# Patient Record
Sex: FEMALE | Race: WHITE
[De-identification: names, ages, dates, MRNs, and addresses within clinical notes are randomized per-mention and may not be internally consistent; named-entity substitution may affect disease eponyms.]

---

## 2022-11-10 ENCOUNTER — HOSPITAL ENCOUNTER (EMERGENCY)
Dept: HOSPITAL 97 - ER | Age: 22
Discharge: HOME | End: 2022-11-10
Payer: SELF-PAY

## 2022-11-10 VITALS — TEMPERATURE: 98.7 F | SYSTOLIC BLOOD PRESSURE: 136 MMHG | DIASTOLIC BLOOD PRESSURE: 79 MMHG | OXYGEN SATURATION: 100 %

## 2022-11-10 DIAGNOSIS — L03.312: Primary | ICD-10-CM

## 2022-11-10 PROCEDURE — 99281 EMR DPT VST MAYX REQ PHY/QHP: CPT

## 2022-11-10 NOTE — ER
Nurse's Notes                                                                                     

 [facility]                                                                 

Name: Temi Chand                                                                               

Age: 22 yrs                                                                                       

Sex: Female                                                                                       

: 2000                                                                                   

MRN: [id number]                                                                                   

Arrival Date: 11/10/2022                                                                          

Time: 08:44                                                                                       

Account#: [id number]                                                                            

Bed Waiting                                                                                       

Private MD:                                                                                       

Diagnosis: Cellulitis of back [any part except buttock]                                           

                                                                                                  

Presentation:                                                                                     

11/10                                                                                             

09:00 Chief complaint: Patient states: was at [facility] this morning, dx with cellulitis and      jh5 

      given bactrim, keflex, and muprirocin. Coronavirus screen: Vaccine status: Patient          

      reports being unvaccinated. Ebola Screen: Patient negative for fever greater than or        

      equal to 101.5 degrees Fahrenheit, and additional compatible Ebola Virus Disease            

      symptoms Patient denies exposure to infectious person. Patient denies travel to an          

      Ebola-affected area in the 21 days before illness onset. Initial Sepsis Screen: Does        

      the patient meet any 2 criteria? No. Patient's initial sepsis screen is negative. Does      

      the patient have a suspected source of infection? No. Patient's initial sepsis screen       

      is negative. Risk Assessment: Do you want to hurt yourself or someone else? Patient         

      reports no desire to harm self or others.                                                   

09:00 Method Of Arrival: Ambulatory                                                           [facility] 

09:00 Acuity: NAVARRO 4                                                                           5 

                                                                                                  

Triage Assessment:                                                                                

09:06 General: Appears in no apparent distress. slender, well groomed, well developed,        [facility] 

      Behavior is calm, cooperative, appropriate for age. Pain: Complains of pain in back.        

                                                                                                  

OB/GYN:                                                                                           

09:06 LMP N/A - Birth control method                                                          [facility] 

                                                                                                  

Historical:                                                                                       

- Allergies:                                                                                      

09:06 No Known Allergies;                                                                     [facility] 

- PMHx:                                                                                           

09:06 None;                                                                                   [facility] 

                                                                                                  

- Immunization history:: Adult Immunizations up to date.                                          

- Social history:: Smoking status: Patient denies any tobacco usage or history of.                

                                                                                                  

                                                                                                  

Vital Signs:                                                                                      

09:00  / 79; Pulse 92; Resp 16; Temp 98.7; Pulse Ox 100% ; Weight 56.7 kg; Height 5 ft. jh5 

      4 in. (162.56 cm); Pain 3/10;                                                               

09:00 Body Mass Index 21.46 (56.70 kg, 162.56 cm)                                             [facility] 

                                                                                                  

ED Course:                                                                                        

08:44 Patient arrived in ED.                                                                  mr  

09:00 Es Mckeon PA is PHCP.                                                         en  

09:00 Jc Yeboah MD is Attending Physician.                                                en  

09:06 Triage completed.                                                                       [facility] 

09:06 Arm band placed on right wrist.                                                         5 

                                                                                                  

Administered Medications:                                                                         

No medications were administered                                                                  

                                                                                                  

                                                                                                  

Outcome:                                                                                          

: Discharge ordered by MD.                                                                en  

09:14 Patient left the ED.                                                                    5 

                                                                                                  

Signatures:                                                                                       

Juli Amaya                                                   

CristianHazel RN                       RN   5                                                  

Es Mckeon PA PA   en                                                   

                                                                                                  

**************************************************************************************************

## 2022-11-10 NOTE — EDPHYS
Physician Documentation                                                                           

 [facility]                                                                 

Name: Temi Chand                                                                               

Age: 22 yrs                                                                                       

Sex: Female                                                                                       

: 2000                                                                                   

MRN: [id number]                                                                                   

Arrival Date: 11/10/2022                                                                          

Time: 08:44                                                                                       

Account#: [id number]                                                                            

Bed Waiting                                                                                       

Private MD:                                                                                       

ED Physician Jc Yeboah                                                                         

HPI:                                                                                              

11/10                                                                                             

09:09 This 22 yrs old Female presents to ER via Ambulatory with complaints of Cellulitis to   en  

      back.                                                                                       

09:07 Detail cannot presents to ED with red painful rash to the back since yesterday after    en  

      being bit by a "Palacos. She was seen at [facility] overnight. She was given prescription        

      for clindamycin, Keflex, mupirocin and given an unknown "antibiotic shot". She denies       

      fever, chills, nausea, vomiting. She has pain over the mid back without induration or       

      purulent drainage..                                                                         

                                                                                                  

OB/GYN:                                                                                           

09:06 LMP N/A - Birth control method                                                          [facility] 

                                                                                                  

Historical:                                                                                       

- Allergies:                                                                                      

09:06 No Known Allergies;                                                                     [facility] 

- PMHx:                                                                                           

09:06 None;                                                                                   [facility] 

                                                                                                  

- Immunization history:: Adult Immunizations up to date.                                          

- Social history:: Smoking status: Patient denies any tobacco usage or history of.                

                                                                                                  

                                                                                                  

ROS:                                                                                              

09:09 Constitutional: Negative for fever, chills, and weight loss.                            en  

09:09 Constitutional: Negative for body aches, fatigue, fever, malaise.                           

09:09 Cardiovascular: Negative for chest pain, palpitations.                                      

09:09 Respiratory: Negative for cough, dyspnea on exertion, shortness of breath.                  

09:09 Abdomen/GI: Negative for abdominal pain, nausea, vomiting, and diarrhea.                    

09:09 Back: Positive for Pain secondary to rash..                                                 

09:09 Skin: Positive for Painful erythematous rash over the mid back.                             

                                                                                                  

Exam:                                                                                             

09:09 Constitutional:  This is a well developed, well nourished patient who is awake, alert,  en  

      and in no acute distress.                                                                   

09:09 Constitutional: The patient appears in no acute distress, alert, awake.                     

09:09 Head/face: Exam is negative for acute changes.                                              

09:09 Eyes: Conjunctiva: normal, no exudate, no injection.                                        

09:09 ENT: Exam is negative for No oropharyngeal swelling or edema..                              

09:09 Cardiovascular: Exam negative for  acute changes, gallop, murmur, rub, Rate: normal,        

      Rhythm: regular, Pulses: no pulse deficits are appreciated.                                 

09:09 Respiratory: Exam negative for  acute changes, the patient does not display signs of        

      respiratory distress,  Respirations: normal, Breath sounds: are clear throughout.           

09:09 Back: See skin exam.                                                                        

09:09 Skin: Left side of upper back with hypersensitive erythematous patchy rash without          

      induration fluctuance or drainage..                                                         

                                                                                                  

Vital Signs:                                                                                      

09:00  / 79; Pulse 92; Resp 16; Temp 98.7; Pulse Ox 100% ; Weight 56.7 kg; Height 5 ft. jh5 

      4 in. (162.56 cm); Pain 3/10;                                                               

09:00 Body Mass Index 21.46 (56.70 kg, 162.56 cm)                                             [facility] 

                                                                                                  

MDM:                                                                                              

09:07 Patient medically screened.                                                             en  

09:07 Differential diagnosis: cellulitis. Data reviewed: vital signs, nurses notes.           en  

09:09 ED course: Spoke with patient regarding antibiotics prophylaxis. She was written for    en  

      the appropriate antibiotics. There is no evidence of abscess or induration to suggest a     

      developing abscess. Will DC home with warm compresses and instructions to complete          

      antibiotics as prescribed. ER return precautions reviewed.                                  

                                                                                                  

Administered Medications:                                                                         

No medications were administered                                                                  

                                                                                                  

                                                                                                  

Disposition:                                                                                      

09:18 Co-signature as Attending Physician, Jc Yeboah MD Attestation: The patient's history, rn  

      exam findings, diagnostics, and a summary of any interventions or procedures was            

      reviewed in detail with Es DAVILA.                                               

                                                                                                  

Disposition Summary:                                                                              

11/10/22 09:09                                                                                    

Discharge Ordered                                                                                 

      Location: Home                                                                          en  

      Problem: new                                                                            en  

      Condition: Stable                                                                       en  

      Diagnosis                                                                                   

        - Cellulitis of back [any part except buttock]                                        en  

      Followup:                                                                               en  

        - With: Private Physician                                                                  

        - When: As needed                                                                          

        - Reason:                                                                                  

      Discharge Instructions:                                                                     

        - Discharge Summary Sheet                                                             en  

        - Cellulitis, Adult                                                                   en  

      Forms:                                                                                      

        - Medication Reconciliation Form                                                      en  

        - Thank You Letter                                                                    en  

        - Antibiotic Education                                                                en  

        - Prescription Opioid Use                                                             en  

Signatures:                                                                                       

Jc Yeboah MD MD rn Rees, Jessica RN                       RN   [facility]                                                  

Es Mckeon PA PA   en                                                   

                                                                                                  

Corrections: (The following items were deleted from the chart)                                    

09:12 09:07 Detail cannot presents to ED with red painful rash to the back since yesterday    en  

      after being bit by a "Palacos. She was seen at [facility] overnight. She was given. en           

                                                                                                  

**************************************************************************************************